# Patient Record
(demographics unavailable — no encounter records)

---

## 2025-03-26 NOTE — PHYSICAL EXAM
[Acute Distress] : no acute distress [Alert] : alert [Erythematous Oropharynx] : nonerythematous oropharynx [de-identified] : multiple tiny smooth skin colored papules w/ central umbilication clustered on R abdomen, 2 with hemorrhagic crust

## 2025-03-26 NOTE — HISTORY OF PRESENT ILLNESS
[de-identified] : Rash [FreeTextEntry6] : Garima is a 5 yo F who presents with rash.  She saw a dermatologist a few months ago when it first came about and she was diagnosed with contact dermatitis, prescribed something but then rx was never actually called into pharmacy.  The rash is located on her abdomen and has been present for a few months.  It is not itchy Parents are applying cortisone to it.   S: non fragranced M: cetaphil D: Purex

## 2025-03-26 NOTE — DISCUSSION/SUMMARY
[FreeTextEntry1] : Molluscum  - discussed natural course of molluscum can last several months to 1+ year to resolve. Not harmful. Self resolving.  - refer to dermatology if interested in treatment

## 2025-06-10 NOTE — PHYSICAL EXAM
[Alert] : alert [No Acute Distress] : no acute distress [Normocephalic] : normocephalic [Conjunctivae with no discharge] : conjunctivae with no discharge [PERRL] : PERRL [Auricles Well Formed] : auricles well formed [Clear Tympanic membranes with present light reflex and bony landmarks] : clear tympanic membranes with present light reflex and bony landmarks [No Discharge] : no discharge [Nares Patent] : nares patent [Pink Nasal Mucosa] : pink nasal mucosa [Palate Intact] : palate intact [Uvula Midline] : uvula midline [Nonerythematous Oropharynx] : nonerythematous oropharynx [Supple, full passive range of motion] : supple, full passive range of motion [Symmetric Chest Rise] : symmetric chest rise [Clear to Auscultation Bilaterally] : clear to auscultation bilaterally [Normoactive Precordium] : normoactive precordium [Regular Rate and Rhythm] : regular rate and rhythm [Normal S1, S2 present] : normal S1, S2 present [No Murmurs] : no murmurs [Soft] : soft [NonTender] : non tender [Non Distended] : non distended [Normoactive Bowel Sounds] : normoactive bowel sounds [No Hepatomegaly] : no hepatomegaly [No Splenomegaly] : no splenomegaly [Yusuf 1] : Yusuf 1 [Normally Placed] : normally placed [Symmetric Buttocks Creases] : symmetric buttocks creases [Symmetric Hip Rotation] : symmetric hip rotation [No Gait Asymmetry] : no gait asymmetry [No pain or deformities with palpation of bone, muscles, joints] : no pain or deformities with palpation of bone, muscles, joints [Normal Muscle Tone] : normal muscle tone [Straight] : straight [Cranial Nerves Grossly Intact] : cranial nerves grossly intact [No Rash or Lesions] : no rash or lesions

## 2025-06-10 NOTE — HISTORY OF PRESENT ILLNESS
[___ stools every other day] : [unfilled]  stools every other day [Normal] : Normal [In own bed] : In own bed [Brushing teeth] : Brushing teeth [Yes] : Patient goes to dentist yearly [Tap water] : Primary Fluoride Source: Tap water [In Pre-K] : In Pre-K [No] : Not at  exposure [Playtime (60 min/d)] : Playtime 60 min a day [Appropiate parent-child-sibling interaction] : Appropriate parent-child-sibling interaction [Child Cooperates] : Child cooperates [Parent has appropriate responses to behavior] : Parent has appropriate responses to behavior [Fruit] : fruit [Vegetables] : vegetables [Meat] : meat [Eggs] : eggs [Grains] : grains [Fish] : fish [Dairy] : dairy [Car seat in back seat] : Car seat in back seat [Carbon Monoxide Detectors] : Carbon monoxide detectors [Smoke Detectors] : Smoke detectors [Supervised outdoor play] : Supervised outdoor play [Up to date] : Up to date [FreeTextEntry3] : 7:30p- 6:15am [FreeTextEntry1] : Garima is a 6 yo F who presents for wce. She is s/p tympanostomy tubes (they have since fallen out) She has atopic dermatitis and occasionally flares behind legs, no active flares today.   Diet - fermín, bread, butter, waffles, yogurt.  - mac and cheese, carrots, cucumbers, fermín, turkey styles, steak, pasta with butter.  - Likes cheese (every day), yogurts. Smoothies. Doesn't drink much milk.  Stools every other day.   School: [employer], just finished prek Activities: tennis

## 2025-06-10 NOTE — DISCUSSION/SUMMARY
[Normal Growth] : growth [Normal Development] : development  [No Elimination Concerns] : elimination [Continue Regimen] : feeding [No Skin Concerns] : skin [Normal Sleep Pattern] : sleep [None] : no medical problems [School Readiness] : school readiness [Mental Health] : mental health [Nutrition and Physical Activity] : nutrition and physical activity [Oral Health] : oral health [Safety] : safety [Anticipatory Guidance Given] : Anticipatory guidance addressed as per the history of present illness section [No Medications] : ~He/She~ is not on any medications [Mother] : mother [Full Activity without restrictions including Physical Education & Athletics] : Full Activity without restrictions including Physical Education & Athletics [FreeTextEntry1] : JOHANNY is a 5 year yo girl who presents for wce. Growth and development normal for age. Vitals reviewed - normal for age. Hearing and vision screens passed.  #6yo - Continue balanced diet with all food groups. Brush teeth twice a day with toothbrush. Recommend visit to dentist. As per car seat 's guidelines, use forward-facing booster seat until child reaches highest weight/height for seat. Child needs to ride in a belt-positioning booster seat until  4 feet 9 inches has been reached and are between 8 and 12 years of age. Put child to sleep in own bed. Help child to maintain consistent daily routines and sleep schedule.  discussed. Ensure home is safe. Teach child about personal safety. Use consistent, positive discipline. Read aloud to child. Limit screen time to no more than 2 hours per day. - return in 1 yr or sooner if needed  #atopic derm  - htc 2.5% prn for flare